# Patient Record
Sex: FEMALE | Race: WHITE | NOT HISPANIC OR LATINO
[De-identification: names, ages, dates, MRNs, and addresses within clinical notes are randomized per-mention and may not be internally consistent; named-entity substitution may affect disease eponyms.]

---

## 2017-06-14 ENCOUNTER — APPOINTMENT (OUTPATIENT)
Dept: RADIOLOGY | Facility: CLINIC | Age: 62
End: 2017-06-14

## 2017-06-14 ENCOUNTER — APPOINTMENT (OUTPATIENT)
Dept: ULTRASOUND IMAGING | Facility: CLINIC | Age: 62
End: 2017-06-14

## 2017-06-14 ENCOUNTER — OUTPATIENT (OUTPATIENT)
Dept: OUTPATIENT SERVICES | Facility: HOSPITAL | Age: 62
LOS: 1 days | End: 2017-06-14
Payer: COMMERCIAL

## 2017-06-14 ENCOUNTER — APPOINTMENT (OUTPATIENT)
Dept: MAMMOGRAPHY | Facility: CLINIC | Age: 62
End: 2017-06-14

## 2017-06-14 DIAGNOSIS — N63 UNSPECIFIED LUMP IN BREAST: ICD-10-CM

## 2017-06-14 PROCEDURE — 77080 DXA BONE DENSITY AXIAL: CPT

## 2017-06-14 PROCEDURE — 77067 SCR MAMMO BI INCL CAD: CPT

## 2017-06-14 PROCEDURE — 77063 BREAST TOMOSYNTHESIS BI: CPT

## 2017-06-14 PROCEDURE — 76641 ULTRASOUND BREAST COMPLETE: CPT

## 2017-06-20 DIAGNOSIS — M85.89 OTHER SPECIFIED DISORDERS OF BONE DENSITY AND STRUCTURE, MULTIPLE SITES: ICD-10-CM

## 2017-06-20 DIAGNOSIS — R92.2 INCONCLUSIVE MAMMOGRAM: ICD-10-CM

## 2017-06-20 DIAGNOSIS — N63 UNSPECIFIED LUMP IN BREAST: ICD-10-CM

## 2017-06-20 DIAGNOSIS — Z12.31 ENCOUNTER FOR SCREENING MAMMOGRAM FOR MALIGNANT NEOPLASM OF BREAST: ICD-10-CM

## 2017-07-11 ENCOUNTER — APPOINTMENT (OUTPATIENT)
Dept: ULTRASOUND IMAGING | Facility: CLINIC | Age: 62
End: 2017-07-11

## 2017-07-11 ENCOUNTER — OUTPATIENT (OUTPATIENT)
Dept: OUTPATIENT SERVICES | Facility: HOSPITAL | Age: 62
LOS: 1 days | End: 2017-07-11
Payer: COMMERCIAL

## 2017-07-11 DIAGNOSIS — Z00.8 ENCOUNTER FOR OTHER GENERAL EXAMINATION: ICD-10-CM

## 2017-07-11 PROCEDURE — 76536 US EXAM OF HEAD AND NECK: CPT

## 2017-07-28 ENCOUNTER — APPOINTMENT (OUTPATIENT)
Dept: ULTRASOUND IMAGING | Facility: CLINIC | Age: 62
End: 2017-07-28
Payer: COMMERCIAL

## 2017-07-28 ENCOUNTER — RESULT REVIEW (OUTPATIENT)
Age: 62
End: 2017-07-28

## 2017-07-28 ENCOUNTER — OUTPATIENT (OUTPATIENT)
Dept: OUTPATIENT SERVICES | Facility: HOSPITAL | Age: 62
LOS: 1 days | End: 2017-07-28
Payer: COMMERCIAL

## 2017-07-28 DIAGNOSIS — Z00.8 ENCOUNTER FOR OTHER GENERAL EXAMINATION: ICD-10-CM

## 2017-07-28 PROCEDURE — 76942 ECHO GUIDE FOR BIOPSY: CPT | Mod: 26

## 2017-07-28 PROCEDURE — 10022: CPT

## 2017-07-28 PROCEDURE — 76942 ECHO GUIDE FOR BIOPSY: CPT

## 2017-09-08 ENCOUNTER — APPOINTMENT (OUTPATIENT)
Dept: GASTROENTEROLOGY | Facility: CLINIC | Age: 62
End: 2017-09-08

## 2017-10-04 ENCOUNTER — APPOINTMENT (OUTPATIENT)
Dept: GASTROENTEROLOGY | Facility: CLINIC | Age: 62
End: 2017-10-04
Payer: COMMERCIAL

## 2017-10-04 VITALS
HEART RATE: 83 BPM | WEIGHT: 151 LBS | RESPIRATION RATE: 14 BRPM | HEIGHT: 64 IN | DIASTOLIC BLOOD PRESSURE: 70 MMHG | TEMPERATURE: 98 F | SYSTOLIC BLOOD PRESSURE: 130 MMHG | BODY MASS INDEX: 25.78 KG/M2 | OXYGEN SATURATION: 98 %

## 2017-10-04 DIAGNOSIS — E27.9 DISORDER OF ADRENAL GLAND, UNSPECIFIED: ICD-10-CM

## 2017-10-04 DIAGNOSIS — R10.9 UNSPECIFIED ABDOMINAL PAIN: ICD-10-CM

## 2017-10-04 DIAGNOSIS — K63.5 POLYP OF COLON: ICD-10-CM

## 2017-10-04 PROCEDURE — 99244 OFF/OP CNSLTJ NEW/EST MOD 40: CPT

## 2017-11-16 ENCOUNTER — OTHER (OUTPATIENT)
Age: 62
End: 2017-11-16

## 2017-12-20 ENCOUNTER — APPOINTMENT (OUTPATIENT)
Dept: SPINE | Facility: CLINIC | Age: 62
End: 2017-12-20
Payer: COMMERCIAL

## 2017-12-20 VITALS
SYSTOLIC BLOOD PRESSURE: 155 MMHG | DIASTOLIC BLOOD PRESSURE: 85 MMHG | BODY MASS INDEX: 26.93 KG/M2 | WEIGHT: 152 LBS | HEART RATE: 65 BPM | HEIGHT: 63 IN

## 2017-12-20 PROCEDURE — 99214 OFFICE O/P EST MOD 30 MIN: CPT

## 2018-01-24 ENCOUNTER — FORM ENCOUNTER (OUTPATIENT)
Age: 63
End: 2018-01-24

## 2018-01-25 ENCOUNTER — APPOINTMENT (OUTPATIENT)
Dept: RADIOLOGY | Facility: CLINIC | Age: 63
End: 2018-01-25
Payer: COMMERCIAL

## 2018-01-25 ENCOUNTER — OUTPATIENT (OUTPATIENT)
Dept: OUTPATIENT SERVICES | Facility: HOSPITAL | Age: 63
LOS: 1 days | End: 2018-01-25
Payer: COMMERCIAL

## 2018-01-25 DIAGNOSIS — Z00.8 ENCOUNTER FOR OTHER GENERAL EXAMINATION: ICD-10-CM

## 2018-01-25 PROCEDURE — 73502 X-RAY EXAM HIP UNI 2-3 VIEWS: CPT

## 2018-01-25 PROCEDURE — 73502 X-RAY EXAM HIP UNI 2-3 VIEWS: CPT | Mod: 26,LT

## 2018-02-01 ENCOUNTER — OUTPATIENT (OUTPATIENT)
Dept: OUTPATIENT SERVICES | Facility: HOSPITAL | Age: 63
LOS: 1 days | End: 2018-02-01
Payer: COMMERCIAL

## 2018-02-01 ENCOUNTER — APPOINTMENT (OUTPATIENT)
Dept: ULTRASOUND IMAGING | Facility: CLINIC | Age: 63
End: 2018-02-01
Payer: COMMERCIAL

## 2018-02-01 DIAGNOSIS — Z00.8 ENCOUNTER FOR OTHER GENERAL EXAMINATION: ICD-10-CM

## 2018-02-01 DIAGNOSIS — D25.9 LEIOMYOMA OF UTERUS, UNSPECIFIED: ICD-10-CM

## 2018-02-01 PROCEDURE — 76830 TRANSVAGINAL US NON-OB: CPT

## 2018-02-01 PROCEDURE — 93975 VASCULAR STUDY: CPT | Mod: 26

## 2018-02-01 PROCEDURE — 93975 VASCULAR STUDY: CPT

## 2018-02-01 PROCEDURE — 76830 TRANSVAGINAL US NON-OB: CPT | Mod: 26

## 2018-06-19 ENCOUNTER — APPOINTMENT (OUTPATIENT)
Dept: ULTRASOUND IMAGING | Facility: CLINIC | Age: 63
End: 2018-06-19
Payer: COMMERCIAL

## 2018-06-19 ENCOUNTER — OUTPATIENT (OUTPATIENT)
Dept: OUTPATIENT SERVICES | Facility: HOSPITAL | Age: 63
LOS: 1 days | End: 2018-06-19
Payer: COMMERCIAL

## 2018-06-19 ENCOUNTER — APPOINTMENT (OUTPATIENT)
Dept: MAMMOGRAPHY | Facility: CLINIC | Age: 63
End: 2018-06-19
Payer: COMMERCIAL

## 2018-06-19 DIAGNOSIS — Z00.8 ENCOUNTER FOR OTHER GENERAL EXAMINATION: ICD-10-CM

## 2018-06-19 DIAGNOSIS — N60.19 DIFFUSE CYSTIC MASTOPATHY OF UNSPECIFIED BREAST: ICD-10-CM

## 2018-06-19 PROCEDURE — 76641 ULTRASOUND BREAST COMPLETE: CPT

## 2018-06-19 PROCEDURE — 76641 ULTRASOUND BREAST COMPLETE: CPT | Mod: 26,50

## 2018-06-19 PROCEDURE — 77067 SCR MAMMO BI INCL CAD: CPT | Mod: 26

## 2018-06-19 PROCEDURE — 77063 BREAST TOMOSYNTHESIS BI: CPT

## 2018-06-19 PROCEDURE — 77063 BREAST TOMOSYNTHESIS BI: CPT | Mod: 26

## 2018-06-19 PROCEDURE — 77067 SCR MAMMO BI INCL CAD: CPT

## 2019-01-09 ENCOUNTER — APPOINTMENT (OUTPATIENT)
Dept: SPINE | Facility: CLINIC | Age: 64
End: 2019-01-09
Payer: COMMERCIAL

## 2019-01-09 VITALS
WEIGHT: 128 LBS | DIASTOLIC BLOOD PRESSURE: 74 MMHG | HEART RATE: 60 BPM | BODY MASS INDEX: 21.85 KG/M2 | HEIGHT: 64 IN | SYSTOLIC BLOOD PRESSURE: 125 MMHG

## 2019-01-09 PROCEDURE — 99214 OFFICE O/P EST MOD 30 MIN: CPT

## 2019-01-09 RX ORDER — OMEGA-3/DHA/EPA/FISH OIL 300-1000MG
CAPSULE ORAL
Refills: 0 | Status: ACTIVE | COMMUNITY

## 2019-05-07 ENCOUNTER — RESULT REVIEW (OUTPATIENT)
Age: 64
End: 2019-05-07

## 2019-05-29 ENCOUNTER — MESSAGE (OUTPATIENT)
Age: 64
End: 2019-05-29

## 2019-05-29 ENCOUNTER — APPOINTMENT (OUTPATIENT)
Dept: GASTROENTEROLOGY | Facility: CLINIC | Age: 64
End: 2019-05-29
Payer: COMMERCIAL

## 2019-05-29 VITALS
DIASTOLIC BLOOD PRESSURE: 62 MMHG | HEART RATE: 86 BPM | OXYGEN SATURATION: 98 % | HEIGHT: 64 IN | WEIGHT: 128 LBS | SYSTOLIC BLOOD PRESSURE: 112 MMHG | RESPIRATION RATE: 15 BRPM | TEMPERATURE: 98.3 F | BODY MASS INDEX: 21.85 KG/M2

## 2019-05-29 DIAGNOSIS — Z12.11 ENCOUNTER FOR SCREENING FOR MALIGNANT NEOPLASM OF COLON: ICD-10-CM

## 2019-05-29 PROCEDURE — 99214 OFFICE O/P EST MOD 30 MIN: CPT

## 2019-05-29 RX ORDER — SODIUM PICOSULFATE, MAGNESIUM OXIDE, AND ANHYDROUS CITRIC ACID 10; 3.5; 12 MG/160ML; G/160ML; G/160ML
10-3.5-12 MG-GM LIQUID ORAL
Qty: 1 | Refills: 0 | Status: DISCONTINUED | COMMUNITY
Start: 2019-05-29 | End: 2019-05-29

## 2019-05-31 NOTE — REASON FOR VISIT
[Follow-Up: _____] : a [unfilled] follow-up visit [FreeTextEntry1] : in preparation for a surveillance colonoscopy.

## 2019-05-31 NOTE — ASSESSMENT
[FreeTextEntry1] : Impression:\par \par #1 colonic sessile  serrated polyp-status post removal of a 6 mm ascending colon SSP at the 6/9/14 colonoscopy. Rule out metachronous lesions. Currently asymptomatic from GI standpoint.\par \par #2 history of crampy abdominal pain-previous report of 3 isolated and self-limited episodes of nocturnal abdominal cramping with a relatively long time intervals between episodes. Resolved.\par \par #3 sigmoid diverticulosis.\par \par #4 chronic constipation-stable and long-standing with excellent treatment response to current regimen of senna 2 tabs h.s. and 5 prunes in the morning. Denies change in bowel habit. \par \par #5 status post exploratory laparotomy 2001.\par \par #6 status post left adrenalectomy 2011-patient reports benign 5 cm mass was removed.\par \par #7 history of infectious mononucleosis associated hepatitis 1974-resolved.

## 2019-05-31 NOTE — CONSULT LETTER
[Dear  ___] : Dear  [unfilled], [Consult Letter:] : I had the pleasure of evaluating your patient, [unfilled]. [( Thank you for referring [unfilled] for consultation for _____ )] : Thank you for referring [unfilled] for consultation for [unfilled] [Please see my note below.] : Please see my note below. [Consult Closing:] : Thank you very much for allowing me to participate in the care of this patient.  If you have any questions, please do not hesitate to contact me. [Sincerely,] : Sincerely, [Aj Wong M.D.] : Aj Wong M.D. [Division of Gastroenterology] : Division of Gastroenterology [[facility]] : [facility] [[address] : [address] [[place] [address]] : [place] [address] [FreeTextEntry2] : Dr. Guillermina Deras [FreeTextEntry3] : Aj Wong M.D.

## 2019-05-31 NOTE — HISTORY OF PRESENT ILLNESS
[FreeTextEntry1] : Office revisit on 5/29/19.\par \par Presents for followup in preparation for surveillance colonoscopy.\par \par Previously evaluated for office consultation on 10/4/17.\par \par INITIAL CONSULTATION NOTE:\par \par Office consultation on 10/4/17.\par \par The patient is a 62-year-old woman who presents for consultation regarding the timing of a surveillance colonoscopy. In addition, she has experienced episodes of crampy abdominal pain. PMD: Dr. Guillermina Deras.\par \par The patient indicates she has experienced 3 isolated and self-limited episodes of lower abdominal cramps. The episodes occurred 7/2017, 8/2017 and 9/2017. The last episode was one month ago. The patient has felt fine during that time interval between episodes. She indicates feeling fine at the current time.\par \par Typical episode is described as experiencing the onset of nonradiating lower abdominal cramps in the evening when the patient is going to bed. She hasn't yet fallen asleep. The cramps occurred throughout the night and are associated with feeling gassy and mildly bloated. However, there is no distention, nausea, vomiting or fever. The patient is able to pass flatus. Typically by the morning the cramps have already resolved and she then has her normal bowel movement as per her usual bowel habit.\par \par The patient has not identified any provocative factors such as particular food or anything else. As noted, she feels fine during the time interval between these episodes.\par \par Patient's appetite is stable. She may have gained a few pounds. Denies any complaints of dysphagia, heartburn, nausea or vomiting. Denies any abdominal pain during the time interval between episodes.\par \par The patient has a long-standing history of chronic constipation that is alleviated with 2 senna at h.s. and 5 prunes for breakfast. Also is on a high-fiber diet with salads. Typically has one to 2 formed bowel movements daily. Denies any change in bowel habit. Denies rectal bleeding.\par \par Colonoscopy had been performed in 8/2009 which was normal except for a mid ascending colon diverticulum.\par \par Colonoscopy was performed on 6/9/14 at which time a 6 mm mid ascending colon sessile serrated polyp was removed. Sigmoid diverticulosis was noted.\par \par The patient had self-limited acute hepatitis attributed to infectious mononucleosis in 1974.\par \par The patient underwent a left adrenalectomy in 2011 for a 5 cm left adrenal mass that was reported to be benign. The patient indicates that significant adhesions were noted by the surgeon.\par \par Patient sustained injuries [place] resulting in pneumothorax, rib fractures as well as an arm fracture. She required varies orthopedic surgeries. She had exploratory laparotomy but no significant internal injury was detected.\par \par There is no other digestive history reported by the patient. The patient reports that a paternal grandfather was treated for colon cancer in his 70s. No other family history of colon cancer is reported.\par \par Review of 6/27/17 labs:\par -CBC: WBC 6580, hemoglobin 13, hematocrit 41.3, platelet count 216,000.\par -CMP: Creatinine 0.88. Liver enzymes normal. ALT 18.\par -TSH 1.41.\par \par CURRENT HISTORY:\par \par ORV 5/29/19:    -Presents in preparation for surveillance colonoscopy. Previously reported crampy abdominal pain at consultation of 10/4/17 resolved. Currently asymptomatic from GI standpoint. Excellent appetite. Indicates 25 pound weight loss over the past 18 months on Weight Wat[employer] diet. Denies any complaints of dysphagia, heartburn, nausea, vomiting, diarrhea, constipation, change in bowel habit or rectal bleeding. On long-standing regimen of senna 2 tabs q.h.s. and 5 prunes daily patient has one to 2 formed bowel movements. Also takes magnesium, B12, vitamin D and probiotic.\par

## 2019-05-31 NOTE — PHYSICAL EXAM
[General Appearance - Alert] : alert [General Appearance - In No Acute Distress] : in no acute distress [General Appearance - Well Nourished] : well nourished [General Appearance - Well Developed] : well developed [General Appearance - Well-Appearing] : healthy appearing [Sclera] : the sclera and conjunctiva were normal [Oropharynx] : the oropharynx was normal [Neck Appearance] : the appearance of the neck was normal [Neck Cervical Mass (___cm)] : no neck mass was observed [Respiration, Rhythm And Depth] : normal respiratory rhythm and effort [Exaggerated Use Of Accessory Muscles For Inspiration] : no accessory muscle use [Auscultation Breath Sounds / Voice Sounds] : lungs were clear to auscultation bilaterally [Heart Rate And Rhythm] : heart rate was normal and rhythm regular [Heart Sounds] : normal S1 and S2 [Heart Sounds Gallop] : no gallops [Murmurs] : no murmurs [Heart Sounds Pericardial Friction Rub] : no pericardial rub [Edema] : there was no peripheral edema [Bowel Sounds] : normal bowel sounds [Abdomen Soft] : soft [Abdomen Tenderness] : non-tender [] : no hepato-splenomegaly [Abdomen Mass (___ Cm)] : no abdominal mass palpated [Abdomen Hernia] : no hernia was discovered [Cervical Lymph Nodes Enlarged Posterior Bilaterally] : posterior cervical [Cervical Lymph Nodes Enlarged Anterior Bilaterally] : anterior cervical [Supraclavicular Lymph Nodes Enlarged Bilaterally] : supraclavicular [No CVA Tenderness] : no ~M costovertebral angle tenderness [Abnormal Walk] : normal gait [Nail Clubbing] : no clubbing  or cyanosis of the fingernails [Skin Color & Pigmentation] : normal skin color and pigmentation [Oriented To Time, Place, And Person] : oriented to person, place, and time [Impaired Insight] : insight and judgment were intact [Affect] : the affect was normal [Mood] : the mood was normal [FreeTextEntry1] : Healed midline scar is noted.

## 2019-06-25 ENCOUNTER — APPOINTMENT (OUTPATIENT)
Dept: ULTRASOUND IMAGING | Facility: CLINIC | Age: 64
End: 2019-06-25
Payer: COMMERCIAL

## 2019-06-25 ENCOUNTER — APPOINTMENT (OUTPATIENT)
Dept: MAMMOGRAPHY | Facility: CLINIC | Age: 64
End: 2019-06-25
Payer: COMMERCIAL

## 2019-06-25 ENCOUNTER — OUTPATIENT (OUTPATIENT)
Dept: OUTPATIENT SERVICES | Facility: HOSPITAL | Age: 64
LOS: 1 days | End: 2019-06-25
Payer: COMMERCIAL

## 2019-06-25 DIAGNOSIS — Z00.8 ENCOUNTER FOR OTHER GENERAL EXAMINATION: ICD-10-CM

## 2019-06-25 PROCEDURE — 77067 SCR MAMMO BI INCL CAD: CPT | Mod: 26

## 2019-06-25 PROCEDURE — 76641 ULTRASOUND BREAST COMPLETE: CPT | Mod: 26,50

## 2019-06-25 PROCEDURE — 77063 BREAST TOMOSYNTHESIS BI: CPT | Mod: 26

## 2019-06-25 PROCEDURE — 77067 SCR MAMMO BI INCL CAD: CPT

## 2019-06-25 PROCEDURE — 77063 BREAST TOMOSYNTHESIS BI: CPT

## 2019-06-25 PROCEDURE — 76641 ULTRASOUND BREAST COMPLETE: CPT

## 2019-07-10 ENCOUNTER — APPOINTMENT (OUTPATIENT)
Dept: MAMMOGRAPHY | Facility: CLINIC | Age: 64
End: 2019-07-10
Payer: COMMERCIAL

## 2019-07-10 ENCOUNTER — OUTPATIENT (OUTPATIENT)
Dept: OUTPATIENT SERVICES | Facility: HOSPITAL | Age: 64
LOS: 1 days | End: 2019-07-10
Payer: COMMERCIAL

## 2019-07-10 ENCOUNTER — APPOINTMENT (OUTPATIENT)
Dept: ULTRASOUND IMAGING | Facility: CLINIC | Age: 64
End: 2019-07-10
Payer: COMMERCIAL

## 2019-07-10 DIAGNOSIS — R92.2 INCONCLUSIVE MAMMOGRAM: ICD-10-CM

## 2019-07-10 PROCEDURE — 77065 DX MAMMO INCL CAD UNI: CPT | Mod: 26,LT

## 2019-07-10 PROCEDURE — G0279: CPT

## 2019-07-10 PROCEDURE — 77065 DX MAMMO INCL CAD UNI: CPT

## 2019-07-10 PROCEDURE — 76642 ULTRASOUND BREAST LIMITED: CPT | Mod: 26,LT

## 2019-07-10 PROCEDURE — G0279: CPT | Mod: 26

## 2019-07-10 PROCEDURE — 76642 ULTRASOUND BREAST LIMITED: CPT

## 2019-07-15 ENCOUNTER — APPOINTMENT (OUTPATIENT)
Dept: ULTRASOUND IMAGING | Facility: CLINIC | Age: 64
End: 2019-07-15
Payer: COMMERCIAL

## 2019-07-15 ENCOUNTER — RESULT REVIEW (OUTPATIENT)
Age: 64
End: 2019-07-15

## 2019-07-15 ENCOUNTER — OUTPATIENT (OUTPATIENT)
Dept: OUTPATIENT SERVICES | Facility: HOSPITAL | Age: 64
LOS: 1 days | End: 2019-07-15
Payer: COMMERCIAL

## 2019-07-15 DIAGNOSIS — Z00.8 ENCOUNTER FOR OTHER GENERAL EXAMINATION: ICD-10-CM

## 2019-07-15 DIAGNOSIS — N63.20 UNSPECIFIED LUMP IN THE LEFT BREAST, UNSPECIFIED QUADRANT: ICD-10-CM

## 2019-07-15 PROCEDURE — 19083 BX BREAST 1ST LESION US IMAG: CPT

## 2019-07-15 PROCEDURE — A4648: CPT

## 2019-07-15 PROCEDURE — 77065 DX MAMMO INCL CAD UNI: CPT

## 2019-07-15 PROCEDURE — 88305 TISSUE EXAM BY PATHOLOGIST: CPT

## 2019-07-15 PROCEDURE — 19083 BX BREAST 1ST LESION US IMAG: CPT | Mod: LT

## 2019-07-15 PROCEDURE — 77065 DX MAMMO INCL CAD UNI: CPT | Mod: 26,LT

## 2019-07-15 PROCEDURE — 88305 TISSUE EXAM BY PATHOLOGIST: CPT | Mod: 26

## 2019-07-18 ENCOUNTER — APPOINTMENT (OUTPATIENT)
Dept: MAMMOGRAPHY | Facility: CLINIC | Age: 64
End: 2019-07-18

## 2019-07-18 ENCOUNTER — APPOINTMENT (OUTPATIENT)
Dept: ULTRASOUND IMAGING | Facility: CLINIC | Age: 64
End: 2019-07-18

## 2019-07-30 ENCOUNTER — APPOINTMENT (OUTPATIENT)
Dept: GASTROENTEROLOGY | Facility: CLINIC | Age: 64
End: 2019-07-30

## 2019-11-04 ENCOUNTER — APPOINTMENT (OUTPATIENT)
Dept: GASTROENTEROLOGY | Facility: HOSPITAL | Age: 64
End: 2019-11-04

## 2019-11-04 ENCOUNTER — TRANSCRIPTION ENCOUNTER (OUTPATIENT)
Age: 64
End: 2019-11-04

## 2019-11-04 ENCOUNTER — OUTPATIENT (OUTPATIENT)
Dept: OUTPATIENT SERVICES | Facility: HOSPITAL | Age: 64
LOS: 1 days | Discharge: HOME | End: 2019-11-04
Payer: COMMERCIAL

## 2019-11-04 VITALS
OXYGEN SATURATION: 99 % | TEMPERATURE: 97 F | DIASTOLIC BLOOD PRESSURE: 73 MMHG | SYSTOLIC BLOOD PRESSURE: 125 MMHG | HEART RATE: 63 BPM | HEIGHT: 64 IN | RESPIRATION RATE: 11 BRPM | WEIGHT: 128.09 LBS

## 2019-11-04 VITALS
OXYGEN SATURATION: 100 % | HEART RATE: 53 BPM | RESPIRATION RATE: 15 BRPM | SYSTOLIC BLOOD PRESSURE: 118 MMHG | DIASTOLIC BLOOD PRESSURE: 66 MMHG

## 2019-11-04 DIAGNOSIS — Z12.11 ENCOUNTER FOR SCREENING FOR MALIGNANT NEOPLASM OF COLON: ICD-10-CM

## 2019-11-04 PROCEDURE — 45378 DIAGNOSTIC COLONOSCOPY: CPT

## 2019-11-04 RX ORDER — SODIUM CHLORIDE 9 MG/ML
1000 INJECTION, SOLUTION INTRAVENOUS
Refills: 0 | Status: DISCONTINUED | OUTPATIENT
Start: 2019-11-04 | End: 2019-11-22

## 2019-11-04 RX ADMIN — SODIUM CHLORIDE 30 MILLILITER(S): 9 INJECTION, SOLUTION INTRAVENOUS at 07:54

## 2019-11-04 NOTE — ASU PATIENT PROFILE, ADULT - PSH
Admission for Chest Tube Place  9/11/01 right lung collapsed  Arm Injuries  right arm 2x irrigation and debridement, surgeryx1 2001.wire and screws implanted.3/02 release of scarring of 4 and 5th finger  Breast Nodule  right and left biopsy negative  Lipoma  right upper abdomen benign  Other Injury of Abdomen  exploratory to determine bleeding  S/P Colonoscopy  2009.  "negative"  Tonsillitis  tonsils removed childhood

## 2019-11-04 NOTE — ASU PATIENT PROFILE, ADULT - PMH
Acute Lung Injury  9- ([facility]) .  Right lung punctured.  Adrenal Mass  Left.  dx: ( Feb. 2010)  HTN    Insomnia    Mitral Valve Prolapse    Osteopenia    Rib Fractures  Bilateral ( 9-: [facility]).  Ulna Fracture  Right. ( 9-: [facility])

## 2020-01-22 ENCOUNTER — APPOINTMENT (OUTPATIENT)
Dept: SPINE | Facility: CLINIC | Age: 65
End: 2020-01-22
Payer: COMMERCIAL

## 2020-01-22 VITALS
HEIGHT: 64 IN | BODY MASS INDEX: 22.2 KG/M2 | WEIGHT: 130 LBS | OXYGEN SATURATION: 99 % | SYSTOLIC BLOOD PRESSURE: 137 MMHG | HEART RATE: 68 BPM | DIASTOLIC BLOOD PRESSURE: 77 MMHG | TEMPERATURE: 98.1 F

## 2020-01-22 DIAGNOSIS — Z80.0 FAMILY HISTORY OF MALIGNANT NEOPLASM OF DIGESTIVE ORGANS: ICD-10-CM

## 2020-01-22 PROCEDURE — 99213 OFFICE O/P EST LOW 20 MIN: CPT

## 2020-01-22 NOTE — REASON FOR VISIT
[Follow-Up: _____] : a [unfilled] follow-up visit [Other: _____] : [unfilled] [FreeTextEntry1] : 65 year old female with a follow up for an incidental nerve sheath tumor of L3 found by symptoms consisting of lower back pain which has fully resolved.  She has no back pain at present and no urine dysfunction or reported leg weakness.   She remains active as a .    A follow up lumbar non contrast MRI is present for review from [facility] from this month - 1/7/2020.

## 2020-01-22 NOTE — ASSESSMENT
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  She is followed conservatively since May 2016 with a stable appearance of L3 tumor.  Today she reports no pain and the recent images are  stable in appearance with no morphology changes.  In two years she will follow up with MRI of the lumbar spine non non contrast, January 2022.

## 2020-01-22 NOTE — DATA REVIEWED
[de-identified] : Lumbar MRI  From [facility] 1/7/2020, non contrast

## 2020-07-29 ENCOUNTER — OUTPATIENT (OUTPATIENT)
Dept: OUTPATIENT SERVICES | Facility: HOSPITAL | Age: 65
LOS: 1 days | End: 2020-07-29
Payer: COMMERCIAL

## 2020-07-29 ENCOUNTER — APPOINTMENT (OUTPATIENT)
Dept: ULTRASOUND IMAGING | Facility: CLINIC | Age: 65
End: 2020-07-29
Payer: COMMERCIAL

## 2020-07-29 ENCOUNTER — APPOINTMENT (OUTPATIENT)
Dept: MAMMOGRAPHY | Facility: CLINIC | Age: 65
End: 2020-07-29
Payer: COMMERCIAL

## 2020-07-29 DIAGNOSIS — N60.19 DIFFUSE CYSTIC MASTOPATHY OF UNSPECIFIED BREAST: ICD-10-CM

## 2020-07-29 PROCEDURE — 77067 SCR MAMMO BI INCL CAD: CPT | Mod: 26

## 2020-07-29 PROCEDURE — 77067 SCR MAMMO BI INCL CAD: CPT

## 2020-07-29 PROCEDURE — 76641 ULTRASOUND BREAST COMPLETE: CPT | Mod: 26,50

## 2020-07-29 PROCEDURE — 77063 BREAST TOMOSYNTHESIS BI: CPT | Mod: 26

## 2020-07-29 PROCEDURE — 76641 ULTRASOUND BREAST COMPLETE: CPT

## 2020-07-29 PROCEDURE — 77063 BREAST TOMOSYNTHESIS BI: CPT

## 2021-06-09 ENCOUNTER — APPOINTMENT (OUTPATIENT)
Dept: SPINE | Facility: CLINIC | Age: 66
End: 2021-06-09
Payer: COMMERCIAL

## 2021-06-09 VITALS
DIASTOLIC BLOOD PRESSURE: 79 MMHG | HEART RATE: 62 BPM | SYSTOLIC BLOOD PRESSURE: 124 MMHG | HEIGHT: 64 IN | OXYGEN SATURATION: 97 % | WEIGHT: 136 LBS | BODY MASS INDEX: 23.22 KG/M2

## 2021-06-09 PROCEDURE — 99213 OFFICE O/P EST LOW 20 MIN: CPT

## 2021-06-09 PROCEDURE — 99072 ADDL SUPL MATRL&STAF TM PHE: CPT

## 2021-06-09 RX ORDER — IBUPROFEN 200 MG/1
200 TABLET, COATED ORAL DAILY
Refills: 0 | Status: ACTIVE | COMMUNITY

## 2021-09-14 ENCOUNTER — OUTPATIENT (OUTPATIENT)
Dept: OUTPATIENT SERVICES | Facility: HOSPITAL | Age: 66
LOS: 1 days | End: 2021-09-14
Payer: COMMERCIAL

## 2021-09-14 ENCOUNTER — APPOINTMENT (OUTPATIENT)
Dept: MAMMOGRAPHY | Facility: CLINIC | Age: 66
End: 2021-09-14
Payer: COMMERCIAL

## 2021-09-14 ENCOUNTER — APPOINTMENT (OUTPATIENT)
Dept: ULTRASOUND IMAGING | Facility: CLINIC | Age: 66
End: 2021-09-14
Payer: COMMERCIAL

## 2021-09-14 DIAGNOSIS — Z12.31 ENCOUNTER FOR SCREENING MAMMOGRAM FOR MALIGNANT NEOPLASM OF BREAST: ICD-10-CM

## 2021-09-14 PROCEDURE — 77063 BREAST TOMOSYNTHESIS BI: CPT

## 2021-09-14 PROCEDURE — 77067 SCR MAMMO BI INCL CAD: CPT

## 2021-09-14 PROCEDURE — 77063 BREAST TOMOSYNTHESIS BI: CPT | Mod: 26

## 2021-09-14 PROCEDURE — 77067 SCR MAMMO BI INCL CAD: CPT | Mod: 26

## 2021-09-14 PROCEDURE — 76641 ULTRASOUND BREAST COMPLETE: CPT | Mod: 26,50

## 2021-09-14 PROCEDURE — 76641 ULTRASOUND BREAST COMPLETE: CPT

## 2021-09-15 ENCOUNTER — ASOB RESULT (OUTPATIENT)
Age: 66
End: 2021-09-15

## 2021-09-15 ENCOUNTER — APPOINTMENT (OUTPATIENT)
Dept: OBGYN | Facility: CLINIC | Age: 66
End: 2021-09-15
Payer: COMMERCIAL

## 2021-09-15 VITALS
HEIGHT: 64 IN | WEIGHT: 142 LBS | SYSTOLIC BLOOD PRESSURE: 122 MMHG | DIASTOLIC BLOOD PRESSURE: 80 MMHG | BODY MASS INDEX: 24.24 KG/M2

## 2021-09-15 DIAGNOSIS — M85.80 OTHER SPECIFIED DISORDERS OF BONE DENSITY AND STRUCTURE, UNSPECIFIED SITE: ICD-10-CM

## 2021-09-15 DIAGNOSIS — D21.9 BENIGN NEOPLASM OF CONNECTIVE AND OTHER SOFT TISSUE, UNSPECIFIED: ICD-10-CM

## 2021-09-15 DIAGNOSIS — N90.5 ATROPHY OF VULVA: ICD-10-CM

## 2021-09-15 DIAGNOSIS — Z00.00 ENCOUNTER FOR GENERAL ADULT MEDICAL EXAMINATION W/OUT ABNORMAL FINDINGS: ICD-10-CM

## 2021-09-15 PROCEDURE — 77080 DXA BONE DENSITY AXIAL: CPT

## 2021-09-15 PROCEDURE — 76830 TRANSVAGINAL US NON-OB: CPT

## 2021-09-15 PROCEDURE — 99397 PER PM REEVAL EST PAT 65+ YR: CPT

## 2021-09-15 PROCEDURE — 82270 OCCULT BLOOD FECES: CPT

## 2021-09-16 LAB — HPV HIGH+LOW RISK DNA PNL CVX: NOT DETECTED

## 2021-09-19 LAB — CYTOLOGY CVX/VAG DOC THIN PREP: ABNORMAL

## 2021-10-08 ENCOUNTER — APPOINTMENT (OUTPATIENT)
Dept: GASTROENTEROLOGY | Facility: CLINIC | Age: 66
End: 2021-10-08
Payer: COMMERCIAL

## 2021-10-08 DIAGNOSIS — R14.2 ERUCTATION: ICD-10-CM

## 2021-10-08 DIAGNOSIS — K21.9 GASTRO-ESOPHAGEAL REFLUX DISEASE W/OUT ESOPHAGITIS: ICD-10-CM

## 2021-10-08 PROCEDURE — 99442: CPT

## 2021-10-08 NOTE — HISTORY OF PRESENT ILLNESS
[Home] : at home, [unfilled] , at the time of the visit. [Medical Office: ([facility])___] : at the medical office located in  [FreeTextEntry1] : Telephonic visit on 10/8/2021.\par \par Presents for followup regarding GERD.\par \par Previously evaluated for office consultation on 10/4/17.\par \par INITIAL CONSULTATION NOTE:\par \par Office consultation on 10/4/17.\par \par The patient is a 62-year-old woman who presents for consultation regarding the timing of a surveillance colonoscopy. In addition, she has experienced episodes of crampy abdominal pain. PMD: Dr. Guillermina Deras.\par \par The patient indicates she has experienced 3 isolated and self-limited episodes of lower abdominal cramps. The episodes occurred 7/2017, 8/2017 and 9/2017. The last episode was one month ago. The patient has felt fine during that time interval between episodes. She indicates feeling fine at the current time.\par \par Typical episode is described as experiencing the onset of nonradiating lower abdominal cramps in the evening when the patient is going to bed. She hasn't yet fallen asleep. The cramps occurred throughout the night and are associated with feeling gassy and mildly bloated. However, there is no distention, nausea, vomiting or fever. The patient is able to pass flatus. Typically by the morning the cramps have already resolved and she then has her normal bowel movement as per her usual bowel habit.\par \par The patient has not identified any provocative factors such as particular food or anything else. As noted, she feels fine during the time interval between these episodes.\par \par Patient's appetite is stable. She may have gained a few pounds. Denies any complaints of dysphagia, heartburn, nausea or vomiting. Denies any abdominal pain during the time interval between episodes.\par \par The patient has a long-standing history of chronic constipation that is alleviated with 2 senna at h.s. and 5 prunes for breakfast. Also is on a high-fiber diet with salads. Typically has one to 2 formed bowel movements daily. Denies any change in bowel habit. Denies rectal bleeding.\par \par Colonoscopy had been performed in 8/2009 which was normal except for a mid ascending colon diverticulum.\par \par Colonoscopy was performed on 6/9/14 at which time a 6 mm mid ascending colon sessile serrated polyp was removed. Sigmoid diverticulosis was noted.\par \par The patient had self-limited acute hepatitis attributed to infectious mononucleosis in 1974.\par \par The patient underwent a left adrenalectomy in 2011 for a 5 cm left adrenal mass that was reported to be benign. The patient indicates that significant adhesions were noted by the surgeon.\par \par Patient sustained injuries [place] resulting in pneumothorax, rib fractures as well as an arm fracture. She required varies orthopedic surgeries. She had exploratory laparotomy but no significant internal injury was detected.\par \par There is no other digestive history reported by the patient. The patient reports that a paternal grandfather was treated for colon cancer in his 70s. No other family history of colon cancer is reported.\par \par Review of 6/27/17 labs:\par -CBC: WBC 6580, hemoglobin 13, hematocrit 41.3, platelet count 216,000.\par -CMP: Creatinine 0.88. Liver enzymes normal. ALT 18.\par -TSH 1.41.\par \par CURRENT HISTORY:\par \par ORV 5/29/19:    -Presents in preparation for surveillance colonoscopy. Previously reported crampy abdominal pain at consultation of 10/4/17 resolved. Currently asymptomatic from GI standpoint. Excellent appetite. Indicates 25 pound weight loss over the past 18 months on Weight Watchers diet. Denies any complaints of dysphagia, heartburn, nausea, vomiting, diarrhea, constipation, change in bowel habit or rectal bleeding. On long-standing regimen of senna 2 tabs q.h.s. and 5 prunes daily patient has one to 2 formed bowel movements. Also takes magnesium, B12, vitamin D and probiotic.\par \par COLONOSCOPY 11/4/2019:     -Surveillance colonoscopy was performed on 11/4/19. Patient previously had removal of a colonic adenoma. Total colonoscopy was performed to the cecum with ileoscopy. Normal terminal ileum. Marked sigmoid diverticulosis was noted with muscular hypertrophy and luminal narrowing. External hemorrhoids were detected. 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 The colonoscopy examination was otherwise normal. A followup surveillance colonoscopy is advised in 5 years. \par \par TELEPHONIC VISIT 10/8/2021:     -Presents for follow-up regarding heartburn.  Indicates onset 5/2021.  No previous complaints of heartburn or GERD symptoms.  Onset of symptoms somewhat related to weight gain of 10 pounds and increased consumption of tomato products.  Describe heartburn characterized as typical retrosternal burning almost exclusively at night when recumbent.  No report of daytime symptoms.  At symptom onset patient reported no complaints of dysphagia, weight loss, vomiting or other complaints.  After several days of heartburn patient contacted PMD and pantoprazole 20 mg daily was prescribed.  After running out of pantoprazole patient transitioned to omeprazole 20 mg daily.  Has been on antisecretory therapy since 5/2021 and indicates an excellent treatment response.  Can only recall 1 episode of heartburn since starting PPI therapy.  Of note, is taking omeprazole after dinner.  However, she has modified her regimen and eats her large meal earlier in the day and she is cautious to avoid recumbency for several hours following eating.  She is sleeping with the head of the bed elevated.\par                                                    -No current complaints of heartburn.  Only current symptom is intermittent belching and burping after meals.  Appetite excellent.  Has gained 10 pounds over the past year.  Denies complaints of dysphagia or any current complaints of breakthrough heartburn or regurgitation.  Denies complaints of nausea, vomiting, early satiety or abdominal pain.  Has 1 formed bowel movement daily.\par

## 2021-10-08 NOTE — CONSULT LETTER
[Dear  ___] : Dear  [unfilled], [Consult Letter:] : I had the pleasure of evaluating your patient, [unfilled]. [( Thank you for referring [unfilled] for consultation for _____ )] : Thank you for referring [unfilled] for consultation for [unfilled] [Please see my note below.] : Please see my note below. [Consult Closing:] : Thank you very much for allowing me to participate in the care of this patient.  If you have any questions, please do not hesitate to contact me. [Sincerely,] : Sincerely, [FreeTextEntry2] : Dr. Guillermina Deras [FreeTextEntry3] : Aj Wong M.D.

## 2021-10-08 NOTE — ASSESSMENT
[FreeTextEntry1] : Impression:\par \par #1 GERD–typical symptoms of heartburn and retrosternal burning.  Alarm symptoms not reported.  Excellent treatment response to PPI antisecretory therapy.  Of note, had only been having heartburn for a few days prior to PPI therapy.\par \par #2 belching–only current GI symptoms.  No associated heartburn, regurgitation or other typical GERD symptoms.  Component of aerophagia suspected.\par \par #3 colonic sessile  serrated polyp-status post removal of a 6 mm ascending colon SSP at the 6/9/14 colonoscopy. Rule out metachronous lesions.  Colonoscopy 11/4/2019 without detection of metachronous lesions.\par \par #4 history of crampy abdominal pain-previous report of 3 isolated and self-limited episodes of nocturnal abdominal cramping with a relatively long time intervals between episodes. Resolved.\par \par #5 sigmoid diverticulosis–colonoscopy on 11/4/2019 noted for marked sigmoid diverticulosis with associated muscular hypertrophy and luminal narrowing..\par \par #6 history of chronic constipation-stable and long-standing with excellent treatment response to current regimen of senna 2 tabs h.s. and 5 prunes in the morning.  No current complaint of constipation.\par \par #7 status post exploratory laparotomy 2001.\par \par #8 status post left adrenalectomy 2011-patient reports benign 5 cm mass was removed.\par \par #9 history of infectious mononucleosis associated hepatitis 1974-resolved.

## 2022-03-08 ENCOUNTER — OUTPATIENT (OUTPATIENT)
Dept: OUTPATIENT SERVICES | Facility: HOSPITAL | Age: 67
LOS: 1 days | End: 2022-03-08
Payer: COMMERCIAL

## 2022-03-08 ENCOUNTER — APPOINTMENT (OUTPATIENT)
Dept: RADIOLOGY | Facility: CLINIC | Age: 67
End: 2022-03-08
Payer: COMMERCIAL

## 2022-03-08 DIAGNOSIS — M54.59 OTHER LOW BACK PAIN: ICD-10-CM

## 2022-03-08 PROCEDURE — 72100 X-RAY EXAM L-S SPINE 2/3 VWS: CPT | Mod: 26

## 2022-03-08 PROCEDURE — 72100 X-RAY EXAM L-S SPINE 2/3 VWS: CPT

## 2022-10-11 ENCOUNTER — APPOINTMENT (OUTPATIENT)
Dept: ULTRASOUND IMAGING | Facility: CLINIC | Age: 67
End: 2022-10-11

## 2022-10-11 ENCOUNTER — OUTPATIENT (OUTPATIENT)
Dept: OUTPATIENT SERVICES | Facility: HOSPITAL | Age: 67
LOS: 1 days | End: 2022-10-11
Payer: COMMERCIAL

## 2022-10-11 ENCOUNTER — APPOINTMENT (OUTPATIENT)
Dept: MAMMOGRAPHY | Facility: CLINIC | Age: 67
End: 2022-10-11

## 2022-10-11 DIAGNOSIS — Z00.00 ENCOUNTER FOR GENERAL ADULT MEDICAL EXAMINATION WITHOUT ABNORMAL FINDINGS: ICD-10-CM

## 2022-10-11 PROCEDURE — 77063 BREAST TOMOSYNTHESIS BI: CPT | Mod: 26

## 2022-10-11 PROCEDURE — 76641 ULTRASOUND BREAST COMPLETE: CPT | Mod: 26,50

## 2022-10-11 PROCEDURE — 77067 SCR MAMMO BI INCL CAD: CPT

## 2022-10-11 PROCEDURE — 76641 ULTRASOUND BREAST COMPLETE: CPT

## 2022-10-11 PROCEDURE — 77067 SCR MAMMO BI INCL CAD: CPT | Mod: 26

## 2022-10-11 PROCEDURE — 77063 BREAST TOMOSYNTHESIS BI: CPT

## 2023-02-23 ENCOUNTER — NON-APPOINTMENT (OUTPATIENT)
Age: 68
End: 2023-02-23

## 2023-03-15 ENCOUNTER — APPOINTMENT (OUTPATIENT)
Dept: SPINE | Facility: CLINIC | Age: 68
End: 2023-03-15

## 2023-04-12 ENCOUNTER — APPOINTMENT (OUTPATIENT)
Dept: SPINE | Facility: CLINIC | Age: 68
End: 2023-04-12
Payer: COMMERCIAL

## 2023-04-12 VITALS
HEART RATE: 66 BPM | BODY MASS INDEX: 25.16 KG/M2 | OXYGEN SATURATION: 96 % | WEIGHT: 142 LBS | SYSTOLIC BLOOD PRESSURE: 130 MMHG | HEIGHT: 63 IN | DIASTOLIC BLOOD PRESSURE: 84 MMHG

## 2023-04-12 DIAGNOSIS — D49.2 NEOPLASM OF UNSPECIFIED BEHAVIOR OF BONE, SOFT TISSUE, AND SKIN: ICD-10-CM

## 2023-04-12 PROCEDURE — 99213 OFFICE O/P EST LOW 20 MIN: CPT

## 2023-04-12 RX ORDER — LANSOPRAZOLE 30 MG/1
30 CAPSULE, DELAYED RELEASE ORAL
Refills: 0 | Status: ACTIVE | COMMUNITY

## 2023-04-12 RX ORDER — ASCORBIC ACID 500 MG
TABLET ORAL
Refills: 0 | Status: ACTIVE | COMMUNITY

## 2023-04-12 RX ORDER — PANTOPRAZOLE 20 MG/1
20 TABLET, DELAYED RELEASE ORAL
Refills: 0 | Status: DISCONTINUED | COMMUNITY
End: 2023-04-12

## 2023-04-12 RX ORDER — AMOXICILLIN 500 MG/1
500 CAPSULE ORAL
Refills: 0 | Status: ACTIVE | COMMUNITY

## 2023-11-24 ENCOUNTER — APPOINTMENT (OUTPATIENT)
Dept: ORTHOPEDIC SURGERY | Facility: CLINIC | Age: 68
End: 2023-11-24
Payer: COMMERCIAL

## 2023-11-24 VITALS
HEART RATE: 81 BPM | DIASTOLIC BLOOD PRESSURE: 85 MMHG | HEIGHT: 63 IN | BODY MASS INDEX: 24.8 KG/M2 | SYSTOLIC BLOOD PRESSURE: 134 MMHG | WEIGHT: 140 LBS | TEMPERATURE: 97.7 F

## 2023-11-24 DIAGNOSIS — M16.12 UNILATERAL PRIMARY OSTEOARTHRITIS, LEFT HIP: ICD-10-CM

## 2023-11-24 PROCEDURE — 99204 OFFICE O/P NEW MOD 45 MIN: CPT

## 2023-11-24 PROCEDURE — 73502 X-RAY EXAM HIP UNI 2-3 VIEWS: CPT

## 2023-11-24 RX ORDER — MELOXICAM 15 MG/1
15 TABLET ORAL
Qty: 30 | Refills: 2 | Status: ACTIVE | COMMUNITY
Start: 2023-11-24 | End: 1900-01-01

## 2025-05-19 ENCOUNTER — APPOINTMENT (OUTPATIENT)
Dept: MRI IMAGING | Facility: CLINIC | Age: 70
End: 2025-05-19
Payer: COMMERCIAL

## 2025-05-19 ENCOUNTER — OUTPATIENT (OUTPATIENT)
Dept: OUTPATIENT SERVICES | Facility: HOSPITAL | Age: 70
LOS: 1 days | End: 2025-05-19
Payer: COMMERCIAL

## 2025-05-19 DIAGNOSIS — D49.2 NEOPLASM OF UNSPECIFIED BEHAVIOR OF BONE, SOFT TISSUE, AND SKIN: ICD-10-CM

## 2025-05-19 PROCEDURE — 72148 MRI LUMBAR SPINE W/O DYE: CPT | Mod: 26

## 2025-05-19 PROCEDURE — 72148 MRI LUMBAR SPINE W/O DYE: CPT

## 2025-05-21 ENCOUNTER — APPOINTMENT (OUTPATIENT)
Dept: SPINE | Facility: CLINIC | Age: 70
End: 2025-05-21
Payer: COMMERCIAL

## 2025-05-21 ENCOUNTER — NON-APPOINTMENT (OUTPATIENT)
Age: 70
End: 2025-05-21

## 2025-05-21 VITALS
BODY MASS INDEX: 24.8 KG/M2 | HEIGHT: 63 IN | OXYGEN SATURATION: 97 % | HEART RATE: 66 BPM | SYSTOLIC BLOOD PRESSURE: 130 MMHG | WEIGHT: 140 LBS | DIASTOLIC BLOOD PRESSURE: 87 MMHG

## 2025-05-21 DIAGNOSIS — D49.2 NEOPLASM OF UNSPECIFIED BEHAVIOR OF BONE, SOFT TISSUE, AND SKIN: ICD-10-CM

## 2025-05-21 PROCEDURE — 99213 OFFICE O/P EST LOW 20 MIN: CPT

## 2025-05-21 RX ORDER — DOCUSATE SODIUM 100 MG
100 TABLET ORAL
Refills: 0 | Status: ACTIVE | COMMUNITY

## 2025-05-21 RX ORDER — FAMOTIDINE 20 MG/1
20 TABLET, FILM COATED ORAL
Refills: 0 | Status: ACTIVE | COMMUNITY